# Patient Record
(demographics unavailable — no encounter records)

---

## 2024-11-19 NOTE — IMAGING
[de-identified] : Examination of the right elbow mild swelling noted compared to the left no ecchymosis, no edema.  Skin is intact.  He has restriction actively passively through terminal extension of the elbow.  Tenderness is noted medial and laterally, tenderness is noted over the radial head.  No tenderness over the wrist.  No tenderness over the hand or fingers good range of motion of the wrist.  Able to make a full fist.  Neurovascularly intact.  X-ray images and reports were reviewed from [facility] from yesterday on mom's phone radial neck fracture is noted.  Repeat x-rays today,  bilateral elbow 3 views for comparison purposes, subtle irregularity radial neck right elbow.  X-rays were reviewed with Dr. Mejia in the office today.  AP view is suboptimal on today's images since he is unable to fully straighten his elbow, fracture is better visualized on [facility] images.

## 2024-11-19 NOTE — ASSESSMENT
[FreeTextEntry1] : Patient placed into a long-arm fiberglass cast.  He is to keep the cast clean and dry.  No gym class or sports.  Children's anti-inflammatory as needed for pain.  Follow-up in 3 weeks with Dr. Mejia for cast off repeat elbow x-ray.

## 2024-11-19 NOTE — HISTORY OF PRESENT ILLNESS
[de-identified] : 11-year-old male comes in today with his mom for an evaluation of his right elbow pain and injury that occurred on November 16.  Patient was at a trampoline park and holding onto a ledge and twisted the right elbow.  Was taken to [facility] x-[facility] was done he was told he had a fracture.

## 2024-12-31 NOTE — PHYSICAL EXAM
[Not Examined] : not examined [Normal] : The patient is moving all extremities spontaneously without any gross neurologic deficits. They walk with a fluid nonantalgic gait. There are equal and symmetric deep tendon reflexes in the upper and lower extremities bilaterally. There is gross intact sensation to soft and light touch in the bilateral upper and lower extremities [de-identified] :  ISLT Intact Motor

## 2024-12-31 NOTE — PHYSICAL EXAM
[Not Examined] : not examined [Normal] : The patient is moving all extremities spontaneously without any gross neurologic deficits. They walk with a fluid nonantalgic gait. There are equal and symmetric deep tendon reflexes in the upper and lower extremities bilaterally. There is gross intact sensation to soft and light touch in the bilateral upper and lower extremities [de-identified] :  ISLT Intact Motor

## 2025-01-13 NOTE — IMAGING
[de-identified] : R elbow:  good ROM s/p cast removal, no tenderness to palpation, no edema noted, NV intact.  X-ray R elbow:  well healed radial neck fracture.

## 2025-01-13 NOTE — ASSESSMENT
[FreeTextEntry1] : 11 year old male s/p R radial neck fracture.  He will begin ROM and f/u in 4 months for physis check with Dr. Mejia. Mom is aware if there is any issue she can contact the office and she verbalizes her understanding and agreement.

## 2025-01-13 NOTE — HISTORY OF PRESENT ILLNESS
[de-identified] : ORIGINAL PRESENTATION:  11-year-old male comes in today with his mom for an evaluation of his right elbow pain and injury that occurred on November 16. Patient was at a trampoline park and holding onto a ledge and twisted the right elbow. Was taken to [facility] x-ray was done he was told he had a fracture.  TODAY: I had the pleasure of seeing Hero accompanied by his mother and sister today in follow up.  His previous history and physical findings have been reviewed.  He is under our care for R elbow radial neck fracture which he is receiving continuing active treatment for. He presents today for cast removal and states overall he is feeling well.  He denies any pain at this point.

## 2025-06-12 NOTE — IMAGING
[de-identified] : On examination of the right foot, there is no ecchymosis, no erythema, minimal swelling noted on about the midfoot. Good range of motion to dorsiflexion and plantarflexion with no and pain. Calf is soft, nontender. Negative Homans. Patient has some tenderness on about the navicular bone; he also has some discomfort on about the CFL. Nontender over the peroneal tendon, nontender over the ankle mortise. Nontender over the medial or lateral malleolus. Nontender at the base of the fifth metatarsal. The pain is generalized on about the midfoot. Mild antalgic gait. Neurovascular intact.  3 views radiographs of the right foot were done in the office today, these x-rays were negative for any acute fracture or dislocation.

## 2025-06-12 NOTE — HISTORY OF PRESENT ILLNESS
[de-identified] : 12-year-old male here for an evaluation of injury sustained to the right foot, patient states that he was running on the hallway at school today and when he injured his foot, he states that the pain is 7/10 with activity.

## 2025-06-12 NOTE — DISCUSSION/SUMMARY
[de-identified] : Impression: Right foot injury  Plan: Patient was advised for the use of an ASO brace. Patient was advised for resting, ice, elevation, topical anti-inflammatories, activity as tolerated. Weightbearing as tolerated  Follow-up:4 weeks for repeat evaluation if needed